# Patient Record
Sex: MALE | Race: WHITE | NOT HISPANIC OR LATINO | URBAN - METROPOLITAN AREA
[De-identification: names, ages, dates, MRNs, and addresses within clinical notes are randomized per-mention and may not be internally consistent; named-entity substitution may affect disease eponyms.]

---

## 2024-06-05 ENCOUNTER — TELEPHONE (OUTPATIENT)
Dept: HEPATOLOGY | Facility: CLINIC | Age: 84
End: 2024-06-05
Payer: MEDICARE

## 2024-06-05 NOTE — TELEPHONE ENCOUNTER
Spoke with patient and scheduled an appointment for 06/10/2024 at 11:00 am with Dr. Obdulia Bryson.

## 2024-06-10 ENCOUNTER — OFFICE VISIT (OUTPATIENT)
Dept: HEPATOLOGY | Facility: CLINIC | Age: 84
End: 2024-06-10
Payer: MEDICARE

## 2024-06-10 ENCOUNTER — LAB VISIT (OUTPATIENT)
Dept: LAB | Facility: HOSPITAL | Age: 84
End: 2024-06-10
Attending: INTERNAL MEDICINE
Payer: MEDICARE

## 2024-06-10 VITALS — HEIGHT: 70 IN | WEIGHT: 156.06 LBS | BODY MASS INDEX: 22.34 KG/M2

## 2024-06-10 DIAGNOSIS — K75.89 CHOLESTATIC HEPATITIS: Primary | ICD-10-CM

## 2024-06-10 DIAGNOSIS — K75.89 CHOLESTATIC HEPATITIS: ICD-10-CM

## 2024-06-10 DIAGNOSIS — K75.0 LIVER ABSCESS: ICD-10-CM

## 2024-06-10 LAB
ALBUMIN SERPL BCP-MCNC: 3.4 G/DL (ref 3.5–5.2)
ALP SERPL-CCNC: 553 U/L (ref 55–135)
ALT SERPL W/O P-5'-P-CCNC: 88 U/L (ref 10–44)
ANION GAP SERPL CALC-SCNC: 7 MMOL/L (ref 8–16)
AST SERPL-CCNC: 158 U/L (ref 10–40)
BASOPHILS # BLD AUTO: 0.02 K/UL (ref 0–0.2)
BASOPHILS NFR BLD: 0.4 % (ref 0–1.9)
BILIRUB SERPL-MCNC: 0.7 MG/DL (ref 0.1–1)
BUN SERPL-MCNC: 19 MG/DL (ref 8–23)
CALCIUM SERPL-MCNC: 9.1 MG/DL (ref 8.7–10.5)
CHLORIDE SERPL-SCNC: 107 MMOL/L (ref 95–110)
CO2 SERPL-SCNC: 24 MMOL/L (ref 23–29)
CREAT SERPL-MCNC: 1 MG/DL (ref 0.5–1.4)
DIFFERENTIAL METHOD BLD: ABNORMAL
EOSINOPHIL # BLD AUTO: 0.3 K/UL (ref 0–0.5)
EOSINOPHIL NFR BLD: 5.8 % (ref 0–8)
ERYTHROCYTE [DISTWIDTH] IN BLOOD BY AUTOMATED COUNT: 14.6 % (ref 11.5–14.5)
EST. GFR  (NO RACE VARIABLE): >60 ML/MIN/1.73 M^2
GGT SERPL-CCNC: 511 U/L (ref 8–55)
GLUCOSE SERPL-MCNC: 100 MG/DL (ref 70–110)
HAV IGG SER QL IA: NORMAL
HBV SURFACE AB SER-ACNC: <3 MIU/ML
HBV SURFACE AB SER-ACNC: NORMAL M[IU]/ML
HBV SURFACE AG SERPL QL IA: NORMAL
HCT VFR BLD AUTO: 36.5 % (ref 40–54)
HCV AB SERPL QL IA: NORMAL
HGB BLD-MCNC: 12.1 G/DL (ref 14–18)
IGA SERPL-MCNC: 240 MG/DL (ref 40–350)
IGG SERPL-MCNC: 2348 MG/DL (ref 650–1600)
IGM SERPL-MCNC: 251 MG/DL (ref 50–300)
IMM GRANULOCYTES # BLD AUTO: 0.01 K/UL (ref 0–0.04)
IMM GRANULOCYTES NFR BLD AUTO: 0.2 % (ref 0–0.5)
INR PPP: 1 (ref 0.8–1.2)
LYMPHOCYTES # BLD AUTO: 1.3 K/UL (ref 1–4.8)
LYMPHOCYTES NFR BLD: 27.4 % (ref 18–48)
MCH RBC QN AUTO: 30.9 PG (ref 27–31)
MCHC RBC AUTO-ENTMCNC: 33.2 G/DL (ref 32–36)
MCV RBC AUTO: 93 FL (ref 82–98)
MONOCYTES # BLD AUTO: 0.5 K/UL (ref 0.3–1)
MONOCYTES NFR BLD: 10.5 % (ref 4–15)
NEUTROPHILS # BLD AUTO: 2.6 K/UL (ref 1.8–7.7)
NEUTROPHILS NFR BLD: 55.7 % (ref 38–73)
NRBC BLD-RTO: 0 /100 WBC
PLATELET # BLD AUTO: 109 K/UL (ref 150–450)
PMV BLD AUTO: 10.5 FL (ref 9.2–12.9)
POTASSIUM SERPL-SCNC: 4 MMOL/L (ref 3.5–5.1)
PROT SERPL-MCNC: 8.1 G/DL (ref 6–8.4)
PROTHROMBIN TIME: 10.9 SEC (ref 9–12.5)
RBC # BLD AUTO: 3.92 M/UL (ref 4.6–6.2)
SODIUM SERPL-SCNC: 138 MMOL/L (ref 136–145)
WBC # BLD AUTO: 4.67 K/UL (ref 3.9–12.7)

## 2024-06-10 PROCEDURE — 99213 OFFICE O/P EST LOW 20 MIN: CPT | Mod: PBBFAC | Performed by: INTERNAL MEDICINE

## 2024-06-10 PROCEDURE — 85025 COMPLETE CBC W/AUTO DIFF WBC: CPT | Performed by: INTERNAL MEDICINE

## 2024-06-10 PROCEDURE — 86015 ACTIN ANTIBODY EACH: CPT | Performed by: INTERNAL MEDICINE

## 2024-06-10 PROCEDURE — 85610 PROTHROMBIN TIME: CPT | Performed by: INTERNAL MEDICINE

## 2024-06-10 PROCEDURE — 99999 PR PBB SHADOW E&M-EST. PATIENT-LVL III: CPT | Mod: PBBFAC,,, | Performed by: INTERNAL MEDICINE

## 2024-06-10 PROCEDURE — 82977 ASSAY OF GGT: CPT | Performed by: INTERNAL MEDICINE

## 2024-06-10 PROCEDURE — 86790 VIRUS ANTIBODY NOS: CPT | Performed by: INTERNAL MEDICINE

## 2024-06-10 PROCEDURE — 99205 OFFICE O/P NEW HI 60 MIN: CPT | Mod: S$PBB,,, | Performed by: INTERNAL MEDICINE

## 2024-06-10 PROCEDURE — 86039 ANTINUCLEAR ANTIBODIES (ANA): CPT | Performed by: INTERNAL MEDICINE

## 2024-06-10 PROCEDURE — 86381 MITOCHONDRIAL ANTIBODY EACH: CPT | Performed by: INTERNAL MEDICINE

## 2024-06-10 PROCEDURE — 86225 DNA ANTIBODY NATIVE: CPT | Performed by: INTERNAL MEDICINE

## 2024-06-10 PROCEDURE — 86038 ANTINUCLEAR ANTIBODIES: CPT | Performed by: INTERNAL MEDICINE

## 2024-06-10 PROCEDURE — 86706 HEP B SURFACE ANTIBODY: CPT | Performed by: INTERNAL MEDICINE

## 2024-06-10 PROCEDURE — 86803 HEPATITIS C AB TEST: CPT | Performed by: INTERNAL MEDICINE

## 2024-06-10 PROCEDURE — 87340 HEPATITIS B SURFACE AG IA: CPT | Performed by: INTERNAL MEDICINE

## 2024-06-10 PROCEDURE — 82784 ASSAY IGA/IGD/IGG/IGM EACH: CPT | Mod: 59 | Performed by: INTERNAL MEDICINE

## 2024-06-10 PROCEDURE — 80053 COMPREHEN METABOLIC PANEL: CPT | Performed by: INTERNAL MEDICINE

## 2024-06-10 PROCEDURE — 36415 COLL VENOUS BLD VENIPUNCTURE: CPT | Performed by: INTERNAL MEDICINE

## 2024-06-10 RX ORDER — HYDROCHLOROTHIAZIDE 50 MG/1
1 TABLET ORAL EVERY MORNING
COMMUNITY
Start: 2024-04-18 | End: 2025-04-18

## 2024-06-10 RX ORDER — TURMERIC 400 MG
2 CAPSULE ORAL DAILY
COMMUNITY

## 2024-06-10 RX ORDER — LEVOTHYROXINE SODIUM 75 UG/1
1 TABLET ORAL DAILY
COMMUNITY
Start: 2024-04-18

## 2024-06-10 RX ORDER — VEDOLIZUMAB 300 MG/5ML
300 INJECTION, POWDER, LYOPHILIZED, FOR SOLUTION INTRAVENOUS
COMMUNITY

## 2024-06-10 RX ORDER — METOPROLOL SUCCINATE 200 MG/1
1 TABLET, EXTENDED RELEASE ORAL EVERY MORNING
COMMUNITY
Start: 2024-04-18

## 2024-06-10 RX ORDER — ACETAMINOPHEN 500 MG
1 TABLET ORAL DAILY
COMMUNITY

## 2024-06-10 RX ORDER — OLMESARTAN MEDOXOMIL 40 MG/1
1 TABLET ORAL EVERY MORNING
COMMUNITY
Start: 2024-04-18 | End: 2025-04-18

## 2024-06-10 RX ORDER — BALSALAZIDE DISODIUM 750 MG/1
2250 CAPSULE ORAL 2 TIMES DAILY
COMMUNITY
Start: 2023-06-21

## 2024-06-10 NOTE — PROGRESS NOTES
Subjective:     Lm Nugent is here for evaluation of Elevated Hepatic Enzymes (referral)      HPI  Lm Nugent is here for eval of elevated Alk phos. He has a h/o UC and intermittently elevated LFTs. He denies any new meds, Abx or supplements. He overall is feeling ok. No major liver issues. Occasional RUQ discomfort but think it could be from how he sleeps. No F/C. He has been working on his UC control. +weight loss.      Outside records reviewed      Labs show an elevated anti smooth muscle antibody; they are reports of elevated MICHAEL.    Ultrasound from February 2024 shows a normal liver    5/2024  Liver, core needle biopsy:  - Liver parenchyma with focal microabscesses and moderate periportal fibrosis (Dea's and Justin Stage 2/4) See  comment.  Comment: The liver biopsy shows an intact hepatic architecture, confirmed by the trichrome stain. Portal tracts harbor their  usual portal structures with patchy minimal mixed inflammation comprised of neutrophils, lymphocytes and eosinophils. None to  minimal bile duct injury with bile ductular reaction is seen. There is no evidence of florid duct reaction, granulomas, ductopenia  or interface activity. Hepatocellular inflammation with focal microabscesses are seen without evidence of significant steatosis,  balloon cells, Nereyda hyaline, or cholestasis. CMV and HSV stains are negative for viral inclusions. Trichrome stain highlights  the areas of hepatocellular dropout. Iron stain is negative.  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   Constitutional:       General: He is not in acute distress.     Appearance: He is well-developed.   HENT:      Head: Normocephalic and atraumatic.      Mouth/Throat:      Pharynx: No oropharyngeal exudate.   Eyes:      General: No scleral icterus.        Right eye: No discharge.         Left eye: No discharge.      Conjunctiva/sclera: Conjunctivae normal.      Pupils: Pupils are equal, round, and reactive to light.   Pulmonary:      Effort: Pulmonary effort is normal. No respiratory distress.      Breath sounds: Normal breath sounds. No wheezing.   Abdominal:      General: There is no distension.      Palpations: Abdomen is soft.      Tenderness: There is no abdominal tenderness.   Musculoskeletal:      Right lower leg: No edema.      Left lower leg: No edema.   Neurological:      Mental Status: He is alert and oriented to person, place, and time.   Psychiatric:         Behavior: Behavior normal.         Computed MELD 3.0 unavailable. One or more values for this score either were not found within the given timeframe or did not fit some other criterion.  Computed MELD-Na unavailable. One or more values for this score either were not found within the given timeframe or did not fit some other criterion.      Platelets   Date Value Ref Range Status   05/06/2024 125 (L) 150 - 375 K/uL Final     Blood Urea Nitrogen   Date Value Ref Range Status   08/19/2021 29 (H) 6 - 22 mg/dL Final     Creatinine   Date Value Ref Range Status   08/19/2021 1.13 0.50 - 1.30 mg/dL Final     Calcium   Date Value Ref Range Status   08/19/2021 9.5 8.4 - 10.5 mg/dL Final     Sodium   Date Value Ref Range Status   08/19/2021 138 134 - 146 meq/L Final     Potassium   Date Value Ref Range Status   08/19/2021 4.7 3.6 - 5.3 meq/L Final     No results found for: "AST", "ALT", "ALKPHOS", "BILITOT", "ALBUMIN"  INR   Date Value Ref Range Status   05/06/2024 1.0  Final         Assessment/Plan:     1. Cholestatic hepatitis    2. Liver abscess  "     Lm Nugent is a 84 y.o. male withElevated Hepatic Enzymes (referral)    Cholestatic hepatitis-unclear etiology need to eval further, will look for PSC  -Check labs  -Will have path sent here for review  - MRI with MRCP  -     Immunoglobulins (IgG, IgA, IgM) Quantitative; Future; Expected date: 06/10/2024  -     Anti-Smooth Muscle Antibody; Future; Expected date: 06/10/2024  -     MICHAEL Screen w/Reflex; Future; Expected date: 06/10/2024  -     Antimitochondrial Antibody; Future; Expected date: 06/10/2024  -     Gamma GT; Standing  -     Hepatitis A antibody, IgG; Future; Expected date: 06/10/2024  -     Hepatitis B Surface Ab, Qualitative; Future; Expected date: 06/10/2024  -     Hepatitis B Surface Antigen; Future; Expected date: 06/10/2024  -     Hepatitis C Antibody; Future; Expected date: 06/10/2024  -     Comprehensive Metabolic Panel; Standing  -     CBC Auto Differential; Standing  -     Protime-INR; Future; Expected date: 06/10/2024  -     MRI Abdomen W WO Contrast_INC MRCP (XPD); Future; Expected date: 06/10/2024  -     Specimen to Pathology Liver; Future; Expected date: 06/10/2024    Liver abscess-unclear source of microabscesses will eval per above  -     Comprehensive Metabolic Panel; Standing  -     CBC Auto Differential; Standing  -     MRI Abdomen W WO Contrast_INC MRCP (XPD); Future; Expected date: 06/10/2024  -     Specimen to Pathology Liver; Future; Expected date: 06/10/2024    RTC in 3-4 months with preclinic labs      Obdulia Bryson MD

## 2024-06-11 LAB
ANA PATTERN 1: NORMAL
ANA SER QL IF: POSITIVE
ANA TITR SER IF: >2560 {TITER}

## 2024-06-12 LAB — MITOCHONDRIA AB TITR SER IF: NORMAL {TITER}

## 2024-06-13 LAB
ANTI SM ANTIBODY: 0.12 RATIO (ref 0–0.99)
ANTI SM/RNP ANTIBODY: 0.12 RATIO (ref 0–0.99)
ANTI-SM INTERPRETATION: NEGATIVE
ANTI-SM/RNP INTERPRETATION: NEGATIVE
ANTI-SSA ANTIBODY: 5.33 RATIO (ref 0–0.99)
ANTI-SSA INTERPRETATION: POSITIVE
ANTI-SSB ANTIBODY: 7.11 RATIO (ref 0–0.99)
ANTI-SSB INTERPRETATION: POSITIVE
DSDNA AB SER-ACNC: ABNORMAL [IU]/ML

## 2024-06-14 LAB — SMOOTH MUSCLE AB TITR SER IF: ABNORMAL {TITER}

## 2024-06-19 ENCOUNTER — TELEPHONE (OUTPATIENT)
Dept: HEPATOLOGY | Facility: CLINIC | Age: 84
End: 2024-06-19
Payer: MEDICARE

## 2024-06-19 NOTE — TELEPHONE ENCOUNTER
----- Message from Obdulia Bryson MD sent at 6/10/2024 12:12 PM CDT -----  Please have pathology sent here for review and send note to referral MD

## 2024-06-19 NOTE — TELEPHONE ENCOUNTER
Copy of office visit note has been faxed to Dr. Raymond Silva    Release of Information has been faxed to [facility] for release of biopsy slides.

## 2024-06-24 ENCOUNTER — TELEPHONE (OUTPATIENT)
Dept: HEPATOLOGY | Facility: CLINIC | Age: 84
End: 2024-06-24
Payer: MEDICARE

## 2024-06-24 DIAGNOSIS — K75.89 CHOLESTATIC HEPATITIS: Primary | ICD-10-CM

## 2024-06-24 NOTE — TELEPHONE ENCOUNTER
----- Message from Eric Marino sent at 6/24/2024  2:57 PM CDT -----  Regarding: [facility] speciman  Type: Patient Call Back    Who called:[facility]     What is the request in detail:needing another pair of 15 regarding the patient     Can the clinic reply by [facility]?no    Would the patient rather a call back or a response via [facility]? callback    Best call back number:[phone]    Additional Information:

## 2024-06-26 ENCOUNTER — TELEPHONE (OUTPATIENT)
Dept: HEPATOLOGY | Facility: CLINIC | Age: 84
End: 2024-06-26
Payer: MEDICARE

## 2024-06-26 DIAGNOSIS — R76.8 POSITIVE ANA (ANTINUCLEAR ANTIBODY): Primary | ICD-10-CM

## 2024-06-26 NOTE — TELEPHONE ENCOUNTER
IR Liver Pathology Conference Note    Patient:  Lm Nugent  MRN:   [id number]  YOB: 1940  Date of Transplant:  N/A  Native Diagnosis:     Discussion/Plan:    Presenter: Hepatologist - Obdulia Bryson MD    Reason for presenting: outside slides    Concerns for Pathologists:  Patient with a cholestatic hepatitis picture and liver biopsy suggested micro abscesses.  He does have a history of IBD.  Would like to discuss likely diagnoses and plan.    Lab Results  WBC (K/uL)   Date Value   06/10/2024 4.67     PLT (K/uL)   Date Value   06/10/2024 109 (L)   05/06/2024 125 (L)     INR (no units)   Date Value   06/10/2024 1.0   05/06/2024 1.0     AST (U/L)   Date Value   06/10/2024 158 (H)     ALT (U/L)   Date Value   06/10/2024 88 (H)     BILITOT (mg/dL)   Date Value   06/10/2024 0.7     ALKPHOS (U/L)   Date Value   06/10/2024 553 (H)     CREATININE (mg/dL)   Date Value   06/10/2024 1.0   08/19/2021 1.13   11/02/2020 1.06

## 2024-07-03 ENCOUNTER — HOSPITAL ENCOUNTER (OUTPATIENT)
Dept: RADIOLOGY | Facility: HOSPITAL | Age: 84
Discharge: HOME | End: 2024-07-03
Attending: INTERNAL MEDICINE
Payer: MEDICARE

## 2024-07-03 DIAGNOSIS — K75.0 LIVER ABSCESS: ICD-10-CM

## 2024-07-03 DIAGNOSIS — K75.89 CHOLESTATIC HEPATITIS: ICD-10-CM

## 2024-07-03 PROCEDURE — 76376 3D RENDER W/INTRP POSTPROCES: CPT | Mod: TC

## 2024-07-03 PROCEDURE — 25500020 PHARM REV CODE 255: Performed by: INTERNAL MEDICINE

## 2024-07-03 PROCEDURE — A9585 GADOBUTROL INJECTION: HCPCS | Performed by: INTERNAL MEDICINE

## 2024-07-03 PROCEDURE — 74183 MRI ABD W/O CNTR FLWD CNTR: CPT | Mod: TC

## 2024-07-03 RX ORDER — GADOBUTROL 604.72 MG/ML
7 INJECTION INTRAVENOUS
Status: COMPLETED | OUTPATIENT
Start: 2024-07-03 | End: 2024-07-03

## 2024-07-03 RX ADMIN — GADOBUTROL 7 ML: 604.72 INJECTION INTRAVENOUS at 12:07

## 2024-07-07 ENCOUNTER — TELEPHONE (OUTPATIENT)
Dept: HEPATOLOGY | Facility: CLINIC | Age: 84
End: 2024-07-07
Payer: MEDICARE

## 2024-07-08 NOTE — TELEPHONE ENCOUNTER
IR Liver Pathology Conference Note    Patient:  Lm Nugent  MRN:   [id number]  YOB: 1940  Date of Transplant:  N/A  Native Diagnosis:     Discussion/Plan:    Presenter: Hepatologist - Obdulia Bryson MD    Reason for presenting: diagnosis confirmation    Concerns for Pathologists: Cholestatic hepatitis w/o clear etiology and normal MRI/MRCP    Lab Results  WBC (K/uL)   Date Value   06/10/2024 4.67     PLT (K/uL)   Date Value   06/10/2024 109 (L)   05/06/2024 125 (L)     INR (no units)   Date Value   06/10/2024 1.0   05/06/2024 1.0     AST (U/L)   Date Value   06/10/2024 158 (H)     ALT (U/L)   Date Value   06/10/2024 88 (H)     BILITOT (mg/dL)   Date Value   06/10/2024 0.7     ALKPHOS (U/L)   Date Value   06/10/2024 553 (H)     CREATININE (mg/dL)   Date Value   06/10/2024 1.0   08/19/2021 1.13   11/02/2020 1.06

## 2024-07-11 ENCOUNTER — CONFERENCE (OUTPATIENT)
Dept: TRANSPLANT | Facility: CLINIC | Age: 84
End: 2024-07-11
Payer: MEDICARE

## 2024-07-11 NOTE — TELEPHONE ENCOUNTER
7/11/24 Liver Pathology Conference:     Liver Biopsy(outside slides): lobular neutrophillic aggregates/ Inflamation/ ductal metaplasia- no duct loss - non specific; could be suggestive of PSC/ mild periductal fibrosis/   Fibrosis stage 2/4.

## 2024-07-22 ENCOUNTER — TELEPHONE (OUTPATIENT)
Dept: HEPATOLOGY | Facility: HOSPITAL | Age: 84
End: 2024-07-22
Payer: MEDICARE

## 2024-09-16 ENCOUNTER — TELEPHONE (OUTPATIENT)
Dept: HEPATOLOGY | Facility: CLINIC | Age: 84
End: 2024-09-16
Payer: MEDICARE

## 2024-09-16 DIAGNOSIS — K83.01 PRIMARY SCLEROSING CHOLANGITIS: Primary | ICD-10-CM

## 2024-09-16 NOTE — TELEPHONE ENCOUNTER
----- Message from Dara Cavanaugh sent at 9/16/2024 10:46 AM CDT -----  Contact: pt  Pt states he never got his results from his MRI back from 7/3/2024    Type:  Test Results    Who Called:  pt  Name of Test (Lab/Mammo/Etc):  mri  Date of Test:  7/3  Ordering Provider:  alek  Where the [facility] performed[facility]  Would the patient rather a call back or a response via [facility]? phone  Best Call Back Number:  [phone]  Additional Information:

## 2024-09-16 NOTE — TELEPHONE ENCOUNTER
Returned patient's call and informed him that a message was left by the provider on 7/22 but he states that he never received it. I informed the patient that his MRI was normal but path was sent out to conference. I did inform him that Dr. Bryson has been out but I will check with her to see the status and follow up with him to which he agreed and voiced understanding.

## 2024-09-19 RX ORDER — URSODIOL 500 MG/1
500 TABLET, FILM COATED ORAL 2 TIMES DAILY
Qty: 60 TABLET | Refills: 5 | Status: SHIPPED | OUTPATIENT
Start: 2024-09-19 | End: 2025-09-19

## 2024-09-19 NOTE — TELEPHONE ENCOUNTER
Spoke with patient about IR conference review.  Discuss that biopsy results were still indeterminate but could be suspicious for PSC.  MRI was unremarkable.  Would like to managed with ursodiol for now even though I did discuss there is no treatment for PSC will see if the ursodiol helps with a cholestatic pattern of liver disease.  Prescription sent for 500 mg of ursodiol twice a day.  Patient is already scheduled to see me in about a month.  He should be getting labs with that visit.  I will ask my nurse to make sure she has the CBC, CMP, GGT ordered for prior to that visit I am also adding on an IgG 4 level but needs to be done with those labs.

## 2024-10-10 ENCOUNTER — LAB VISIT (OUTPATIENT)
Dept: LAB | Facility: HOSPITAL | Age: 84
End: 2024-10-10
Attending: INTERNAL MEDICINE
Payer: MEDICARE

## 2024-10-10 DIAGNOSIS — K83.01 PRIMARY SCLEROSING CHOLANGITIS: ICD-10-CM

## 2024-10-10 DIAGNOSIS — K75.89 CHOLESTATIC HEPATITIS: ICD-10-CM

## 2024-10-10 DIAGNOSIS — K75.0 LIVER ABSCESS: ICD-10-CM

## 2024-10-10 LAB
ALBUMIN SERPL BCP-MCNC: 3.1 G/DL (ref 3.5–5.2)
ALP SERPL-CCNC: 254 U/L (ref 55–135)
ALT SERPL W/O P-5'-P-CCNC: 11 U/L (ref 10–44)
ANION GAP SERPL CALC-SCNC: 10 MMOL/L (ref 8–16)
AST SERPL-CCNC: 23 U/L (ref 10–40)
BASOPHILS # BLD AUTO: 0.02 K/UL (ref 0–0.2)
BASOPHILS NFR BLD: 0.4 % (ref 0–1.9)
BILIRUB SERPL-MCNC: 0.9 MG/DL (ref 0.1–1)
BUN SERPL-MCNC: 17 MG/DL (ref 8–23)
CALCIUM SERPL-MCNC: 8.8 MG/DL (ref 8.7–10.5)
CHLORIDE SERPL-SCNC: 106 MMOL/L (ref 95–110)
CO2 SERPL-SCNC: 23 MMOL/L (ref 23–29)
CREAT SERPL-MCNC: 1.1 MG/DL (ref 0.5–1.4)
DIFFERENTIAL METHOD BLD: ABNORMAL
EOSINOPHIL # BLD AUTO: 0.2 K/UL (ref 0–0.5)
EOSINOPHIL NFR BLD: 3.2 % (ref 0–8)
ERYTHROCYTE [DISTWIDTH] IN BLOOD BY AUTOMATED COUNT: 15.8 % (ref 11.5–14.5)
EST. GFR  (NO RACE VARIABLE): >60 ML/MIN/1.73 M^2
GLUCOSE SERPL-MCNC: 108 MG/DL (ref 70–110)
HCT VFR BLD AUTO: 36.1 % (ref 40–54)
HGB BLD-MCNC: 11.6 G/DL (ref 14–18)
IMM GRANULOCYTES # BLD AUTO: 0.02 K/UL (ref 0–0.04)
IMM GRANULOCYTES NFR BLD AUTO: 0.4 % (ref 0–0.5)
LYMPHOCYTES # BLD AUTO: 1.3 K/UL (ref 1–4.8)
LYMPHOCYTES NFR BLD: 26.6 % (ref 18–48)
MCH RBC QN AUTO: 29.8 PG (ref 27–31)
MCHC RBC AUTO-ENTMCNC: 32.1 G/DL (ref 32–36)
MCV RBC AUTO: 93 FL (ref 82–98)
MONOCYTES # BLD AUTO: 0.4 K/UL (ref 0.3–1)
MONOCYTES NFR BLD: 8.9 % (ref 4–15)
NEUTROPHILS # BLD AUTO: 3 K/UL (ref 1.8–7.7)
NEUTROPHILS NFR BLD: 60.5 % (ref 38–73)
NRBC BLD-RTO: 0 /100 WBC
PLATELET # BLD AUTO: 80 K/UL (ref 150–450)
PMV BLD AUTO: 9.9 FL (ref 9.2–12.9)
POTASSIUM SERPL-SCNC: 3.8 MMOL/L (ref 3.5–5.1)
PROT SERPL-MCNC: 7.7 G/DL (ref 6–8.4)
RBC # BLD AUTO: 3.89 M/UL (ref 4.6–6.2)
SODIUM SERPL-SCNC: 139 MMOL/L (ref 136–145)
WBC # BLD AUTO: 4.96 K/UL (ref 3.9–12.7)

## 2024-10-10 PROCEDURE — 82977 ASSAY OF GGT: CPT | Performed by: INTERNAL MEDICINE

## 2024-10-10 PROCEDURE — 36415 COLL VENOUS BLD VENIPUNCTURE: CPT | Performed by: INTERNAL MEDICINE

## 2024-10-10 PROCEDURE — 82787 IGG 1 2 3 OR 4 EACH: CPT | Performed by: INTERNAL MEDICINE

## 2024-10-10 PROCEDURE — 80053 COMPREHEN METABOLIC PANEL: CPT | Performed by: INTERNAL MEDICINE

## 2024-10-10 PROCEDURE — 85025 COMPLETE CBC W/AUTO DIFF WBC: CPT | Performed by: INTERNAL MEDICINE

## 2024-10-11 LAB — GGT SERPL-CCNC: 189 U/L (ref 8–55)

## 2024-10-14 LAB — IGG4 SER-MCNC: 40 MG/DL (ref 4–86)

## 2024-10-18 ENCOUNTER — OFFICE VISIT (OUTPATIENT)
Dept: HEPATOLOGY | Facility: CLINIC | Age: 84
End: 2024-10-18
Payer: MEDICARE

## 2024-10-18 VITALS
DIASTOLIC BLOOD PRESSURE: 89 MMHG | HEIGHT: 70 IN | HEART RATE: 60 BPM | WEIGHT: 159.63 LBS | BODY MASS INDEX: 22.85 KG/M2 | SYSTOLIC BLOOD PRESSURE: 181 MMHG

## 2024-10-18 DIAGNOSIS — K83.01 PRIMARY SCLEROSING CHOLANGITIS: Primary | ICD-10-CM

## 2024-10-18 PROCEDURE — 99999 PR PBB SHADOW E&M-EST. PATIENT-LVL III: CPT | Mod: PBBFAC,,, | Performed by: INTERNAL MEDICINE

## 2024-10-18 PROCEDURE — 99213 OFFICE O/P EST LOW 20 MIN: CPT | Mod: PBBFAC | Performed by: INTERNAL MEDICINE

## 2024-10-18 NOTE — Clinical Note
Please send a copy this note to patient's referring gastroenterologist at [facility]

## 2024-10-18 NOTE — PROGRESS NOTES
Subjective:     Lm Nugent is here for follow up of cholestatic hepatitis      HPI  Since Lm Nugent's last visit he denies any major issues.  He did start the ursodiol in his labs significantly improved.  He continues to follow up with his gastroenterology doctor regarding his ulcerative colitis.    Path conference review    Liver Biopsy(outside slides): lobular neutrophillic aggregates/ Inflamation/ ductal metaplasia- no duct loss - non specific; could be suggestive of PSC/ mild periductal fibrosis/ Fibrosis stage 2/4.     Review of Systems    Objective:     Physical Exam  Vitals reviewed.   Constitutional:       General: He is not in acute distress.     Appearance: He is well-developed.   HENT:      Head: Normocephalic and atraumatic.      Mouth/Throat:      Pharynx: No oropharyngeal exudate.   Eyes:      General: No scleral icterus.        Right eye: No discharge.         Left eye: No discharge.      Conjunctiva/sclera: Conjunctivae normal.      Pupils: Pupils are equal, round, and reactive to light.   Pulmonary:      Effort: Pulmonary effort is normal. No respiratory distress.      Breath sounds: No wheezing.   Abdominal:      General: There is no distension.      Palpations: Abdomen is soft.   Neurological:      Mental Status: He is alert and oriented to person, place, and time.   Psychiatric:         Behavior: Behavior normal.         MELD 3.0: 6 at 6/10/2024 11:59 AM  MELD-Na: 6 at 6/10/2024 11:59 AM  Calculated from:  Serum Creatinine: 1 mg/dL at 6/10/2024 11:59 AM  Serum Sodium: 138 mmol/L (Using max of 137 mmol/L) at 6/10/2024 11:59 AM  Total Bilirubin: 0.7 mg/dL (Using min of 1 mg/dL) at 6/10/2024 11:59 AM  Serum Albumin: 3.4 g/dL at 6/10/2024 11:59 AM  INR(ratio): 1 at 6/10/2024 11:59 AM  Age at listing (hypothetical): 84 years  Sex: Male at 6/10/2024 11:59 AM      WBC   Date Value Ref Range Status   10/10/2024 4.96 3.90 - 12.70 K/uL Final     Hemoglobin   Date Value Ref Range Status    10/10/2024 11.6 (L) 14.0 - 18.0 g/dL Final     Hematocrit   Date Value Ref Range Status   10/10/2024 36.1 (L) 40.0 - 54.0 % Final     Platelets   Date Value Ref Range Status   10/10/2024 80 (L) 150 - 450 K/uL Final     BUN   Date Value Ref Range Status   10/10/2024 17 8 - 23 mg/dL Final     Creatinine   Date Value Ref Range Status   10/10/2024 1.1 0.5 - 1.4 mg/dL Final     Glucose   Date Value Ref Range Status   10/10/2024 108 70 - 110 mg/dL Final     Calcium   Date Value Ref Range Status   10/10/2024 8.8 8.7 - 10.5 mg/dL Final     Sodium   Date Value Ref Range Status   10/10/2024 139 136 - 145 mmol/L Final     Potassium   Date Value Ref Range Status   10/10/2024 3.8 3.5 - 5.1 mmol/L Final     Chloride   Date Value Ref Range Status   10/10/2024 106 95 - 110 mmol/L Final     AST   Date Value Ref Range Status   10/10/2024 23 10 - 40 U/L Final     ALT   Date Value Ref Range Status   10/10/2024 11 10 - 44 U/L Final     Alkaline Phosphatase   Date Value Ref Range Status   10/10/2024 254 (H) 55 - 135 U/L Final     Total Bilirubin   Date Value Ref Range Status   10/10/2024 0.9 0.1 - 1.0 mg/dL Final     Comment:     For infants and newborns, interpretation of results should be based  on gestational age, weight and in agreement with clinical  observations.    Premature Infant recommended reference ranges:  Up to 24 hours.............<8.0 mg/dL  Up to 48 hours............<12.0 mg/dL  3-5 days..................<15.0 mg/dL  6-29 days.................<15.0 mg/dL       Albumin   Date Value Ref Range Status   10/10/2024 3.1 (L) 3.5 - 5.2 g/dL Final     INR   Date Value Ref Range Status   06/10/2024 1.0 0.8 - 1.2 Final     Comment:     Coumadin Therapy:  2.0 - 3.0 for INR for all indicators except mechanical heart valves  and antiphospholipid syndromes which should use 2.5 - 3.5.           Assessment/Plan:     1. Primary sclerosing cholangitis      Lm Nuegnt is a 84 y.o. male withcholestatic hepatitis    Primary  sclerosing cholangitis-patient's MRI was unremarkable based on cholestatic pattern, history of ulcerative colitis and findings on biopsy suspect patient has PSC.  Even though there is no true treatment for PSC he has responded really well to ursodiol.  His labs have improved significantly.  -continue ursodiol  -continue to monitor labs  -will need MRCP once a year for cholangiocarcinoma surveillance  -     Gamma GT; Standing  -     Comprehensive Metabolic Panel; Standing  -     CBC Auto Differential; Standing    Return to clinic in 6 months with preclinic labs      Obdulia Bryson MD